# Patient Record
Sex: FEMALE | URBAN - METROPOLITAN AREA
[De-identification: names, ages, dates, MRNs, and addresses within clinical notes are randomized per-mention and may not be internally consistent; named-entity substitution may affect disease eponyms.]

---

## 2022-06-15 ENCOUNTER — OFFICE (OUTPATIENT)
Dept: URBAN - METROPOLITAN AREA CLINIC 34 | Facility: CLINIC | Age: 87
End: 2022-06-15
Payer: MEDICAID

## 2022-06-15 VITALS
TEMPERATURE: 97.6 F | HEIGHT: 59 IN | WEIGHT: 152 LBS | SYSTOLIC BLOOD PRESSURE: 163 MMHG | HEART RATE: 77 BPM | DIASTOLIC BLOOD PRESSURE: 83 MMHG

## 2022-06-15 DIAGNOSIS — K21.00 GASTRO-ESOPHAGEAL REFLUX DISEASE WITH ESOPHAGITIS, WITHOUT B: ICD-10-CM

## 2022-06-15 DIAGNOSIS — R19.8 OTHER SPECIFIED SYMPTOMS AND SIGNS INVOLVING THE DIGESTIVE S: ICD-10-CM

## 2022-06-15 PROCEDURE — 99204 OFFICE O/P NEW MOD 45 MIN: CPT

## 2022-06-15 RX ORDER — FAMOTIDINE 40 MG/1
TABLET, FILM COATED ORAL
Qty: 30 | Refills: 5 | Status: ACTIVE

## 2022-06-15 NOTE — SERVICEHPINOTES
DESHAUN BENNETT   is a   86   year old    female who is being seen in consultation at the request of   CHET COOPER   for globus sensation.  Chronic GERD sx and has been on dexilant for years. Takes 60mg qAM. Reports globus sensation only when lying down at night. Has seen allergist, was treated for allergies but symptoms didn't improve. Symptoms persisting x 3 months. Reports liquid reflux/regurgitation when she lies down at night or bending over. No issues during the day. Tried elevating head of bed without bed. Dry cough. Eats a large meal around 2-3pm. Occ abd pain depending on what she eats but nothing regular or persistent. No n/v. No dysphagia or odynophagia. Had EGD ~30-40 years. No other testing for symptoms. Appetite is good, no weight loss. NO cardiac issues, seeings cardiologist annually, carient. Takes tylenol prn, no NSAIDs. Nonsmoker. No etoh use.

## 2022-09-07 ENCOUNTER — OFFICE (OUTPATIENT)
Dept: URBAN - METROPOLITAN AREA CLINIC 34 | Facility: CLINIC | Age: 87
End: 2022-09-07
Payer: COMMERCIAL

## 2022-09-07 VITALS
SYSTOLIC BLOOD PRESSURE: 162 MMHG | DIASTOLIC BLOOD PRESSURE: 82 MMHG | HEART RATE: 75 BPM | TEMPERATURE: 97.3 F | HEIGHT: 59 IN | WEIGHT: 163 LBS

## 2022-09-07 DIAGNOSIS — K21.00 GASTRO-ESOPHAGEAL REFLUX DISEASE WITH ESOPHAGITIS, WITHOUT B: ICD-10-CM

## 2022-09-07 DIAGNOSIS — Z87.898 PERSONAL HISTORY OF OTHER SPECIFIED CONDITIONS: ICD-10-CM

## 2022-09-07 DIAGNOSIS — Q38.7: ICD-10-CM

## 2022-09-07 PROCEDURE — 99215 OFFICE O/P EST HI 40 MIN: CPT | Performed by: PHYSICIAN ASSISTANT

## 2022-09-07 NOTE — SERVICEHPINOTES
DESHAUN MYLES   is a   88 yo white  female who is here for f/u results to barium swallow. She was last seen in 06/2022 for initial visit concerning globus sensation. Reviewed 07/11/2022 barium swallow study results noting evidence of esophageal dysmotility and very small collection of barium along post. 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 Since last visit she has continued with Dexilant 60 mg q AM and added Famotidine 40 mg q hs to help w/ nocturnal heartburn, which has been effective. She sleeps with head of bed elevated. Eats dinner by 6 pm No late night meals. At this time main concern is globus sensation and frequent throat clearing events as well as chronic "wheezing"/cough at night. Appetite is good No weight loss. She if followed by cardiologist at [facility] annually. Nonsmoker. No ETOH use. Denies chest pain, voice change, dysphagia/odynophagia, regurgitation, dyspnea, n/v, abdominal pain, change in BMs, melena, weight loss. br
br